# Patient Record
Sex: FEMALE | Race: WHITE
[De-identification: names, ages, dates, MRNs, and addresses within clinical notes are randomized per-mention and may not be internally consistent; named-entity substitution may affect disease eponyms.]

---

## 2019-02-07 ENCOUNTER — OFFICE VISIT (OUTPATIENT)
Dept: OTOLARYNGOLOGY | Facility: CLINIC | Age: 49
End: 2019-02-07
Payer: COMMERCIAL

## 2019-02-07 DIAGNOSIS — Z98.890 POSTOPERATIVE STATE: Primary | ICD-10-CM

## 2019-02-07 NOTE — PROGRESS NOTES
Service Date: 2019      Ms. Naqvi called today.  I had seen her in [place] and reconstructed her cheek.  She was doing well after surgery, and then she had finished her antibiotics more than a week ago, but in the last 24 hours, she has had some swelling and tenderness of the cheek, and it feels tighter.  Given that, I was concerned she might have an infection and even an abscess.  I had her come in.  Her facial nerves are intact.  There is erythema of the skin.  There is some edema there.  Some subtle increase, it appears, in induration.  There is no fluctuance.  There is no discharge.  The suture line is intact.        ASSESSMENT:  She has a potential early infection.  With that in mind, we are going to put her on some Bactroban cream topically and Keflex 500 mg 4 times a day for a week with a double dose for her initial dose as a loading dose.  She is heading to [place].  She has an appointment to see me on the  in [place].         CHEVY MOLINA MD             D: 2019   T: 2019   MT: cristofer      Name:     HONG NAQVI   MRN:      [id number]        Account:      [id number]   :      1970           Service Date: 2019      Document: [id number]

## 2019-02-07 NOTE — LETTER
Date:February 14, 2019      Patient was self referred, no letter generated. Do not send.        [facility] Information

## 2019-02-07 NOTE — LETTER
2/7/2019       RE: Rubina Naqvi  [address] [facility] [address]     Dear Colleague,    Thank you for referring your patient, Rubina Naqvi, to the [facility] at [facility]. Please see a copy of my visit note below.    This office note has been dictated.     Again, thank you for allowing me to participate in the care of your patient.      Sincerely,    CHEVY MOLINA MD